# Patient Record
Sex: FEMALE | Race: WHITE
[De-identification: names, ages, dates, MRNs, and addresses within clinical notes are randomized per-mention and may not be internally consistent; named-entity substitution may affect disease eponyms.]

---

## 2017-04-11 ENCOUNTER — HOSPITAL ENCOUNTER (OUTPATIENT)
Dept: HOSPITAL 47 - RADMAMWWP | Age: 62
Discharge: HOME | End: 2017-04-11
Payer: COMMERCIAL

## 2017-04-11 DIAGNOSIS — M12.872: ICD-10-CM

## 2017-04-11 DIAGNOSIS — Z78.0: ICD-10-CM

## 2017-04-11 DIAGNOSIS — M12.871: ICD-10-CM

## 2017-04-11 DIAGNOSIS — Z12.31: Primary | ICD-10-CM

## 2017-04-11 PROCEDURE — 73630 X-RAY EXAM OF FOOT: CPT

## 2017-04-11 PROCEDURE — 77080 DXA BONE DENSITY AXIAL: CPT

## 2017-04-11 NOTE — BD
EXAMINATION TYPE: MG DEXA axial skeleton.  

 

DATE OF EXAM: 4/11/2017 3:09 PM

 

COMPARISON: DEXA bone scan November 9, 2014

 

CLINICAL HISTORY: Postmenopausal female.

 

Height:  62 IN

Weight:  262 LBS

 

FRAX RISK QUESTIONS:

Alcohol (3 or more units per day):  NO

Family History (Parent hip fracture):  NO

Glucocorticoids (More than 3mos):  NO

           (Ex: prednisone, prednisolone, methylprednisolone, dexamethasone, and hydrocortisone).    
     

History of Fracture in Adulthood: NO

Secondary Osteoporosis:

  1.  Type 1 Diabetes: NO

  2.  Hyperthyroidism: NO

  3.  Menopause before 45: AGE 59

  4.  Malnutrition: NO

  5.  Chronic liver disease: NO

Rheumatoid Arthritis: NO

Current Tobacco Use: NO

 

RISK FACTORS 

HISTORY OF: 

Active: YES

Postmenopausal woman: AGE 59

 

MEDICATIONS: 

Additional Medications: ARTHRITIS PAIN PILL, CHOLESTEROL 

 

 

 

 

EXAM MEASUREMENTS: 

Bone mineral densitometry was performed using the [employer] System.

Bone mineral density as measured about the Lumbar spine is:  

----- L1-L4(G/cm2): 1.091

T Score Values are as follows:

----- L2: -0.8

----- L3: -1.3

----- L4: -0.6

----- L1-L4: -0.7

Bone mineral density has: Decreased 5.3% since study of: 11/7/2014

 

Bone mineral density about the R hip (g/cm2): 1.123

Bone mineral density about the L hip (g/cm2): 0.983

T Score values are as follows:

-----R Neck: 0.6

-----L Neck: -0.4

-----R Intertrochanter: 0.6

-----L Intertrochanter: 0.9

Bone mineral density has: Decreased 3.6% since study of: 11/7/2014

 

 

 

IMPRESSION:

 

Normal (Values between +1 and -1 indicate normal bone mass) overall on current study.

 

 

 

NOTE:  T-SCORE=SD OF THE YOUNG ADULT MEAN.

## 2017-04-13 NOTE — MM
Reason for exam: screening  (asymptomatic).

Last mammogram was performed 1 year and 2 months ago.



History:

Patient is postmenopausal and is nulliparous.

Family history of breast cancer in maternal cousin at age 40.



Physical Findings:

A clinical breast exam by your physician is recommended on an annual basis and 

results should be correlated with mammographic findings.



MG Screening Mammo w CAD

Bilateral CC and MLO view(s) were taken.

Prior study comparison: February 19, 2016, left breast MG 3d work up w/cad LT.  

January 18, 2016, bilateral MG screening mammo w CAD.  November 7, 2014, bilateral

MG screening mammo w CAD.

There are scattered fibroglandular densities.  There is chronic nodularity in the 

right breast.  No significant changes when compared with prior studies.





ASSESSMENT: Negative, BI-RAD 1



RECOMMENDATION:

Routine screening mammogram of both breasts in 1 year.

## 2017-05-26 NOTE — XR
EXAMINATION TYPE: XR foot complete bilateral

 

DATE OF EXAM: 4/11/2017 3:57 PM

 

COMPARISON: NONE

 

HISTORY: Pain and swelling

 

TECHNIQUE: Three views are submitted from each foot.

 

FINDINGS:

Right foot: There is resorption the top of the distal phalanx first digit. Arthropathy of the MTP amie
nts noted. No erosive changes. Arthropathy of the navicular cuneiform joint. Large plantar calcaneal 
spur noted.

 

Left foot: Arthropathy of the MTP joints and DIP joints. Resorption tuft distal phalanx first digit s
ymmetric. Hypertrophic change involving the navicular cuneiform bone. Calcaneal spurs seen.

 

IMPRESSION:

1. No acute fracture or dislocation.  If symptoms persist, follow-up exam in 7 to 10 days could be ob
tained.  

2. Arthropathy and hypertrophic changes. Changes involving the navicular cuneiform junction likely ar
e hypertrophic and arthritic rather than related to tarsal coalition but could be correlated clinical
ly.
Partially impaired: cannot see medication labels or newsprint, but can see obstacles in path, and the surrounding layout; can count fingers at arm's length

## 2021-11-01 ENCOUNTER — HOSPITAL ENCOUNTER (INPATIENT)
Dept: HOSPITAL 47 - EC | Age: 66
LOS: 4 days | Discharge: HOME | DRG: 603 | End: 2021-11-05
Attending: FAMILY MEDICINE | Admitting: FAMILY MEDICINE
Payer: MEDICARE

## 2021-11-01 DIAGNOSIS — Z20.822: ICD-10-CM

## 2021-11-01 DIAGNOSIS — Z91.048: ICD-10-CM

## 2021-11-01 DIAGNOSIS — L40.9: ICD-10-CM

## 2021-11-01 DIAGNOSIS — E66.01: ICD-10-CM

## 2021-11-01 DIAGNOSIS — B95.62: ICD-10-CM

## 2021-11-01 DIAGNOSIS — E78.5: ICD-10-CM

## 2021-11-01 DIAGNOSIS — L03.116: Primary | ICD-10-CM

## 2021-11-01 DIAGNOSIS — Z90.710: ICD-10-CM

## 2021-11-01 DIAGNOSIS — M06.9: ICD-10-CM

## 2021-11-01 LAB
ALBUMIN SERPL-MCNC: 3.5 G/DL (ref 3.5–5)
ALP SERPL-CCNC: 101 U/L (ref 38–126)
ALT SERPL-CCNC: 45 U/L (ref 4–34)
ANION GAP SERPL CALC-SCNC: 10 MMOL/L
AST SERPL-CCNC: 40 U/L (ref 14–36)
BASOPHILS # BLD AUTO: 0.1 K/UL (ref 0–0.2)
BASOPHILS NFR BLD AUTO: 1 %
BUN SERPL-SCNC: 22 MG/DL (ref 7–17)
CALCIUM SPEC-MCNC: 8.7 MG/DL (ref 8.4–10.2)
CHLORIDE SERPL-SCNC: 104 MMOL/L (ref 98–107)
CO2 SERPL-SCNC: 23 MMOL/L (ref 22–30)
EOSINOPHIL # BLD AUTO: 0.1 K/UL (ref 0–0.7)
EOSINOPHIL NFR BLD AUTO: 1 %
ERYTHROCYTE [DISTWIDTH] IN BLOOD BY AUTOMATED COUNT: 4.62 M/UL (ref 3.8–5.4)
ERYTHROCYTE [DISTWIDTH] IN BLOOD: 13.9 % (ref 11.5–15.5)
GLUCOSE SERPL-MCNC: 136 MG/DL (ref 74–99)
HCT VFR BLD AUTO: 44.6 % (ref 34–46)
HGB BLD-MCNC: 14.4 GM/DL (ref 11.4–16)
LYMPHOCYTES # SPEC AUTO: 0.9 K/UL (ref 1–4.8)
LYMPHOCYTES NFR SPEC AUTO: 12 %
MCH RBC QN AUTO: 31.2 PG (ref 25–35)
MCHC RBC AUTO-ENTMCNC: 32.3 G/DL (ref 31–37)
MCV RBC AUTO: 96.6 FL (ref 80–100)
MONOCYTES # BLD AUTO: 0.4 K/UL (ref 0–1)
MONOCYTES NFR BLD AUTO: 5 %
NEUTROPHILS # BLD AUTO: 6.1 K/UL (ref 1.3–7.7)
NEUTROPHILS NFR BLD AUTO: 80 %
PLATELET # BLD AUTO: 188 K/UL (ref 150–450)
POTASSIUM SERPL-SCNC: 4.6 MMOL/L (ref 3.5–5.1)
PROT SERPL-MCNC: 7.3 G/DL (ref 6.3–8.2)
SODIUM SERPL-SCNC: 137 MMOL/L (ref 137–145)
WBC # BLD AUTO: 7.7 K/UL (ref 3.8–10.6)

## 2021-11-01 PROCEDURE — 81001 URINALYSIS AUTO W/SCOPE: CPT

## 2021-11-01 PROCEDURE — 87635 SARS-COV-2 COVID-19 AMP PRB: CPT

## 2021-11-01 PROCEDURE — 96365 THER/PROPH/DIAG IV INF INIT: CPT

## 2021-11-01 PROCEDURE — 83605 ASSAY OF LACTIC ACID: CPT

## 2021-11-01 PROCEDURE — 80053 COMPREHEN METABOLIC PANEL: CPT

## 2021-11-01 PROCEDURE — 99284 EMERGENCY DEPT VISIT MOD MDM: CPT

## 2021-11-01 PROCEDURE — 86140 C-REACTIVE PROTEIN: CPT

## 2021-11-01 PROCEDURE — 80048 BASIC METABOLIC PNL TOTAL CA: CPT

## 2021-11-01 PROCEDURE — 36415 COLL VENOUS BLD VENIPUNCTURE: CPT

## 2021-11-01 PROCEDURE — 85027 COMPLETE CBC AUTOMATED: CPT

## 2021-11-01 PROCEDURE — 87040 BLOOD CULTURE FOR BACTERIA: CPT

## 2021-11-01 PROCEDURE — 96367 TX/PROPH/DG ADDL SEQ IV INF: CPT

## 2021-11-01 PROCEDURE — 85025 COMPLETE CBC W/AUTO DIFF WBC: CPT

## 2021-11-01 RX ADMIN — HYDROCODONE BITARTRATE AND ACETAMINOPHEN PRN EACH: 5; 325 TABLET ORAL at 19:06

## 2021-11-01 NOTE — ED
Extremity Problem HPI





- General


Chief complaint: Extremity Problem,Nontraumatic


Stated complaint: Leg swollen


Source: patient


Mode of arrival: ambulatory





- History of Present Illness


Initial comments: 


66-year-old female past medical history of rheumatoid arthritis, hyperlipidemia 

presents to the emergency department with swollen left lower leg.  States that 

her symptoms started a few days ago and have been progressively getting worse.  

Her left leg is now twice as large as her right and is warm to the touch.  She 

also has some blistering due to the increased swelling.  Denies history of DVT 

or PE.  No chest pain or shortness of breath.  Does admit to chills.  No 

recorded fevers.  No history of similar in the past.  Does have a history of 

psoriasis.  She has had difficulty regulating on it due to the pain.  Denies any

trauma.  No other alleviating, precipitating or modifying factors. 





- Related Data


                                Home Medications











 Medication  Instructions  Recorded  Confirmed


 


Ibuprofen [Advil] 200 mg PO Q6HR PRN 10/08/14 10/09/14











                                    Allergies











Allergy/AdvReac Type Severity Reaction Status Date / Time


 


adhesive Allergy  Rash/Hives. Verified 11/01/21 15:26





   IRIS  














Review of Systems


ROS Statement: 


Those systems with pertinent positive or pertinent negative responses have been 

documented in the HPI.





ROS Other: All systems not noted in ROS Statement are negative.





Past Medical History


Past Medical History: No Reported History, Hyperlipidemia, Rheumatoid Arthritis 

(RA)


History of Any Multi-Drug Resistant Organisms: None Reported


Past Surgical History: Appendectomy, Hysterectomy, Tonsillectomy


Past Anesthesia/Blood Transfusion Reactions: No Reported Reaction


Past Psychological History: No Psychological Hx Reported


Smoking Status: Never smoker


Past Alcohol Use History: None Reported


Past Drug Use History: None Reported





Course


                                   Vital Signs











  11/01/21 11/01/21





  15:21 18:02


 


Temperature 98.8 F 


 


Pulse Rate 97 85


 


Respiratory 18 20





Rate  


 


Blood Pressure 145/75 129/69


 


O2 Sat by Pulse 97 98





Oximetry  














Medical Decision Making





- Medical Decision Making





On arrival the patient is placed in the hallway 10.  Thorough history and 

physical exam is performed.  IV is established and laboratory studies were 

conducted.  Patient was given a dose of Unasyn and Vanco.  Ultrasound was 

attempted however unable to visualize most of the lower extremity.  Popliteal 

vein is negative.  Laboratory studies are reviewed and demonstrate a C-reactive 

protein of 50.9.  Did recommend admitting the patient due to the extensive 

nature of the cellulitis for which the patient did agree to.  Spoke with Dr. Mejía who agreed to admit the patient.  Dr. Martin will be placed on consult.  

Patient remained in stable condition awaiting a bed





- Lab Data


Result diagrams: 


                                 11/01/21 16:16





                                 11/01/21 16:16


                                   Lab Results











  11/01/21 11/01/21 11/01/21 Range/Units





  16:16 16:16 16:16 


 


WBC  7.7    (3.8-10.6)  k/uL


 


RBC  4.62    (3.80-5.40)  m/uL


 


Hgb  14.4    (11.4-16.0)  gm/dL


 


Hct  44.6    (34.0-46.0)  %


 


MCV  96.6    (80.0-100.0)  fL


 


MCH  31.2    (25.0-35.0)  pg


 


MCHC  32.3    (31.0-37.0)  g/dL


 


RDW  13.9    (11.5-15.5)  %


 


Plt Count  188    (150-450)  k/uL


 


MPV  8.1    


 


Neutrophils %  80    %


 


Lymphocytes %  12    %


 


Monocytes %  5    %


 


Eosinophils %  1    %


 


Basophils %  1    %


 


Neutrophils #  6.1    (1.3-7.7)  k/uL


 


Lymphocytes #  0.9 L    (1.0-4.8)  k/uL


 


Monocytes #  0.4    (0-1.0)  k/uL


 


Eosinophils #  0.1    (0-0.7)  k/uL


 


Basophils #  0.1    (0-0.2)  k/uL


 


Sodium   137   (137-145)  mmol/L


 


Potassium   4.6   (3.5-5.1)  mmol/L


 


Chloride   104   ()  mmol/L


 


Carbon Dioxide   23   (22-30)  mmol/L


 


Anion Gap   10   mmol/L


 


BUN   22 H   (7-17)  mg/dL


 


Creatinine   0.74   (0.52-1.04)  mg/dL


 


Est GFR (CKD-EPI)AfAm   >90   (>60 ml/min/1.73 sqM)  


 


Est GFR (CKD-EPI)NonAf   87   (>60 ml/min/1.73 sqM)  


 


Glucose   136 H   (74-99)  mg/dL


 


Plasma Lactic Acid Vinay    1.6  (0.7-2.0)  mmol/L


 


Calcium   8.7   (8.4-10.2)  mg/dL


 


Total Bilirubin   0.5   (0.2-1.3)  mg/dL


 


AST   40 H   (14-36)  U/L


 


ALT   45 H   (4-34)  U/L


 


Alkaline Phosphatase   101   ()  U/L


 


C-Reactive Protein   15.9 H   (<1.0)  mg/dL


 


Total Protein   7.3   (6.3-8.2)  g/dL


 


Albumin   3.5   (3.5-5.0)  g/dL














Disposition


Clinical Impression: 


 Left leg cellulitis





Disposition: ADMITTED AS IP TO THIS HOSP


Condition: Stable


Is patient prescribed a controlled substance at d/c from ED?: No


Referrals: 


Jimenez Mejía MD [Primary Care Provider] - 1-2 days


Decision to Admit Reason: Admit from EC


Decision Date: 11/01/21


Decision Time: 18:27

## 2021-11-01 NOTE — US
EXAMINATION TYPE: US venous doppler duplex LE LT

 

DATE OF EXAM: 11/1/2021 5:17 PM

 

COMPARISON: NONE

 

CLINICAL HISTORY: dvt. 

 

SIDE PERFORMED: Left  

 

TECHNIQUE:  The lower extremity deep venous system is examined utilizing real time linear array sonog
sadie with graded compression, doppler sonography and color-flow sonography.

 

VESSELS IMAGED:

 

 

Morbidly obese patient with extensive intrastitial edema, unable to get on ultrasound stretcher. Migdalia
ent scanned in wheelchair.

 

Left Leg:  The only vessel identified during study was popliteal vein seen only with doppler, patent 
popliteal vein. 

 

 

IMPRESSION: Suboptimal evaluation due to patient body habitus and edema. Only popliteal vein visualiz
ed and patent.

## 2021-11-02 LAB
ANION GAP SERPL CALC-SCNC: 17 MMOL/L (ref 4–12)
BASOPHILS # BLD AUTO: 0 K/UL (ref 0–0.2)
BASOPHILS NFR BLD AUTO: 0 %
BUN SERPL-SCNC: 14.8 MG/DL (ref 9–27)
BUN/CREAT SERPL: 21.14 RATIO (ref 12–20)
CALCIUM SPEC-MCNC: 8.1 MG/DL (ref 8.7–10.3)
CHLORIDE SERPL-SCNC: 104 MMOL/L (ref 96–109)
CO2 SERPL-SCNC: 15 MMOL/L (ref 21.6–31.8)
EOSINOPHIL # BLD AUTO: 0.1 K/UL (ref 0–0.7)
EOSINOPHIL NFR BLD AUTO: 1 %
ERYTHROCYTE [DISTWIDTH] IN BLOOD BY AUTOMATED COUNT: 4.27 M/UL (ref 3.8–5.4)
ERYTHROCYTE [DISTWIDTH] IN BLOOD: 14.3 % (ref 11.5–15.5)
GLUCOSE SERPL-MCNC: 124 MG/DL (ref 70–110)
HCT VFR BLD AUTO: 41 % (ref 34–46)
HGB BLD-MCNC: 13.5 GM/DL (ref 11.4–16)
LYMPHOCYTES # SPEC AUTO: 1.3 K/UL (ref 1–4.8)
LYMPHOCYTES NFR SPEC AUTO: 17 %
MCH RBC QN AUTO: 31.6 PG (ref 25–35)
MCHC RBC AUTO-ENTMCNC: 32.9 G/DL (ref 31–37)
MCV RBC AUTO: 96.1 FL (ref 80–100)
MONOCYTES # BLD AUTO: 0.5 K/UL (ref 0–1)
MONOCYTES NFR BLD AUTO: 6 %
NEUTROPHILS # BLD AUTO: 5.3 K/UL (ref 1.3–7.7)
NEUTROPHILS NFR BLD AUTO: 71 %
PH UR: 5.5 [PH] (ref 5–8)
PLATELET # BLD AUTO: 199 K/UL (ref 150–450)
POTASSIUM SERPL-SCNC: 4.3 MMOL/L (ref 3.5–5.5)
PROT UR QL: (no result)
RBC UR QL: 1 /HPF (ref 0–5)
SODIUM SERPL-SCNC: 136 MMOL/L (ref 135–145)
SP GR UR: 1.03 (ref 1–1.03)
SQUAMOUS UR QL AUTO: 4 /HPF (ref 0–4)
UROBILINOGEN UR QL STRIP: 2 MG/DL (ref ?–2)
WBC # BLD AUTO: 7.4 K/UL (ref 3.8–10.6)
WBC # UR AUTO: 9 /HPF (ref 0–5)

## 2021-11-02 RX ADMIN — AMPICILLIN SODIUM AND SULBACTAM SODIUM SCH MLS/HR: 2; 1 INJECTION, POWDER, FOR SOLUTION INTRAMUSCULAR; INTRAVENOUS at 00:05

## 2021-11-02 RX ADMIN — CEFAZOLIN SCH MLS/HR: 330 INJECTION, POWDER, FOR SOLUTION INTRAMUSCULAR; INTRAVENOUS at 11:53

## 2021-11-02 RX ADMIN — HYDROCODONE BITARTRATE AND ACETAMINOPHEN PRN EACH: 5; 325 TABLET ORAL at 00:07

## 2021-11-02 RX ADMIN — ATORVASTATIN CALCIUM SCH MG: 10 TABLET, FILM COATED ORAL at 21:23

## 2021-11-02 RX ADMIN — HEPARIN SODIUM SCH UNIT: 5000 INJECTION INTRAVENOUS; SUBCUTANEOUS at 08:47

## 2021-11-02 RX ADMIN — HYDROCODONE BITARTRATE AND ACETAMINOPHEN PRN EACH: 5; 325 TABLET ORAL at 19:45

## 2021-11-02 RX ADMIN — HYDROCODONE BITARTRATE AND ACETAMINOPHEN PRN EACH: 5; 325 TABLET ORAL at 16:05

## 2021-11-02 RX ADMIN — PANTOPRAZOLE SODIUM SCH MG: 40 INJECTION, POWDER, FOR SOLUTION INTRAVENOUS at 08:47

## 2021-11-02 RX ADMIN — AMPICILLIN SODIUM AND SULBACTAM SODIUM SCH MLS/HR: 2; 1 INJECTION, POWDER, FOR SOLUTION INTRAMUSCULAR; INTRAVENOUS at 08:42

## 2021-11-02 RX ADMIN — HEPARIN SODIUM SCH UNIT: 5000 INJECTION INTRAVENOUS; SUBCUTANEOUS at 21:00

## 2021-11-02 RX ADMIN — HYDROCODONE BITARTRATE AND ACETAMINOPHEN PRN EACH: 5; 325 TABLET ORAL at 08:57

## 2021-11-02 NOTE — P.CONS
History of Present Illness





- Reason for Consult


Consult date: 11/02/21


left leg cellulitis


Requesting physician: Jimenez Mejía





- Chief Complaint


left leg swelling and redness x few days





- History of Present Illness


History of present illness : Patient is 66-year-old  female presenting 

to the hospital last evening for evaluation of increasing pain swelling redness 

of the left lower extremity that apparently has been going on for the last few 

days patient did have progressive worsening swelling to the left leg and 

subsequently resulted to becoming more redder and painful patient described the 

pain to be more of a dull aching to throbbing 5-6 out of 10 and no radiation 

patient has been complaining of fever with chills with the symptom the patient 

was evaluated by the physician on arrival to the ER the patient was afebrile 

subsequently have low-grade fever of 99.4 degree form of endometrial normal 

white count creatinine was normal her lactate is mildly elevated urine was 

negative blood cultures were obtained patient did have a lower extremity Doppler

that has been negative for DVT though it was a suboptimal study patient was 

started on Unasyn and vancomycin she was admitted to hospital infectious disease

was consulted for further management of antibiotic therapy














Review of system:


 CONSTITUTIONAL:  Positive for weakness along with the fever.


EYES:  No complaint.


ENT:  No complaint.


RESPIRATORY:  No complaint.


CARDIOVASCULAR:  No complaint.


GENITOURINARY:  No complaint.


GASTROINTESTINAL:  No complaint.


MUSCULOSKELETAL: As per history of present illness INTEGUMENTARY: As per history

of present illness.


PSYCHOLOGIC: No complaint.


ENDOCRINE: No complaint.


NEUROLOGIC:  No complaint.








Past medical history : Reviewed, documented below


Past surgical history : Reviewed, documented below


Social history: Reviewed, documented below


Medications: Reviewed, as documented below








EXAMINATION:


Vital sigans=  Reviewed and documented below


GENERAL DESCRIPTION: Middle-aged female lying in bed, no distress. No tachypnea 

or accessory muscle of respiration use.


HEENT: Shows Pallor , no scleral icterus. Oral mucous membrane is dry.


NECK: Trachea central, no thyromegaly.


LUNGS: Unlabored breathing. Clear to auscultation anteriorly. No wheeze or 

crackle.


HEART: S1, S2, regular rate and rhythm.


ABDOMEN: Soft, no tenderness , guarding or rigidity


EXTREMITIES: Diffuse swelling redness of the left lower extremity which is warm 

to touch some blister no drainage.


SKIN: No rash, no masses palpable.


NEUROLOGICAL: The patient is awake, alert, oriented x3, mood and affect normal.





LABS AND RADIOLOGY: Reviewed results see below





Assessment : Patient presented to hospital with acute left lower extremity 

cellulitis in this patient who did have refused swelling redness likely 

streptococcal disease clinic note MRSA gram-negative infection











Plan: 1-discontinue vancomycin and Unasyn


2-cefazolin 3 g every 8 hours


3-Marked area of the redness and Ace wrap from just above the toe to below the 

knee


We will follow on clinical condition and cultures to further adjust medication 

if needed


Thank you for this consultation we will follow the patient along with you








Past Medical History


Past Medical History: Hyperlipidemia, Rheumatoid Arthritis (RA)


History of Any Multi-Drug Resistant Organisms: None Reported


Past Surgical History: Appendectomy, Hysterectomy, Tonsillectomy


Past Anesthesia/Blood Transfusion Reactions: No Reported Reaction


Past Psychological History: No Psychological Hx Reported


Smoking Status: Never smoker


Past Alcohol Use History: None Reported


Past Drug Use History: None Reported





Medications and Allergies


                                Home Medications











 Medication  Instructions  Recorded  Confirmed  Type


 


Ibuprofen [Advil] 200 mg PO Q6HR PRN 10/08/14 11/01/21 History


 


Adalimumab [Humira(Cf) Pen] 40 mg SQ Q14D 11/01/21 11/01/21 History


 


Simvastatin [Zocor] 20 mg PO HS 11/01/21 11/01/21 History


 


traMADol HCL 50 mg PO Q6H PRN 11/01/21 11/01/21 History








                                    Allergies











Allergy/AdvReac Type Severity Reaction Status Date / Time


 


adhesive Allergy  Rash/Hives. Verified 11/01/21 18:52





   WELTS  














Physical Exam


Vitals: 


                                   Vital Signs











  Temp Pulse Pulse Resp BP BP Pulse Ox


 


 11/02/21 08:00    100  16   


 


 11/02/21 05:40  99.4 F   100  16   134/77  94 L


 


 11/01/21 20:46  97.8 F  96   18  122/74   98


 


 11/01/21 20:00  98.2 F   103 H  16   146/85  98


 


 11/01/21 18:02   85   20  129/69   98


 


 11/01/21 15:21  98.8 F  97   18  145/75   97








                                Intake and Output











 11/01/21 11/02/21 11/02/21





 22:59 06:59 14:59


 


Intake Total 360 600 


 


Balance 360 600 


 


Intake:   


 


  Intake, IV Titration  600 





  Amount   


 


    Ampicillin-Sulbactam 3 gm  100 





    In Sodium Chloride 0.9%   





    100 ml @ 200 mls/hr IVPB   





    Q8HR [facility] Rx#:[id number]   


 


    Vancomycin 1,750 mg In  500 





    Sodium Chloride 0.9% 500   





    ml 500 ml @ 167 mls/hr   





    IVPB Q16H [facility] Rx#:   





    [id number]   


 


  Oral 360  


 


Other:   


 


  Voiding Method   Toilet


 


  # Voids  1 


 


  Weight 117.934 kg  














Results


CBC & Chem 7: 


                                 11/02/21 04:11





                                 11/02/21 04:11


Labs: 


                  Abnormal Lab Results - Last 24 Hours (Table)











  11/01/21 11/01/21 11/02/21 Range/Units





  16:16 16:16 09:38 


 


Lymphocytes #  0.9 L    (1.0-4.8)  k/uL


 


BUN   22 H   (7-17)  mg/dL


 


Glucose   136 H   (74-99)  mg/dL


 


Plasma Lactic Acid Vinay    2.4 H*  (0.7-2.0)  mmol/L


 


AST   40 H   (14-36)  U/L


 


ALT   45 H   (4-34)  U/L


 


C-Reactive Protein   15.9 H   (<1.0)  mg/dL

## 2021-11-02 NOTE — P.HPIM
History of Present Illness


H&P Date: 11/02/21


Chief Complaint: Left lower extremity erythema





This is a history of physical 66-year-old white female with known history of 

psoriasis who states about 3-4 days prior to admission, the patient had 

significant redness.  She states that yesterday, her leg continue to swell and 

became more red.  She has not been treated for lower extremity cellulitis.  The 

patient states low-grade fever.  No chills no significant nausea, vomiting or 

diarrhea.  No previous history of leg infection.  Trauma.  She was not doing any

yard work no history of insect bites or any type of animal bites stated.





Review of Systems


Constitutional: Reports chronic pain, Reports fever, Denies chills


Eyes: denies blurred vision, denies pain


Ears, nose, mouth and throat: Denies headache, Denies sore throat


Respiratory: Denies cough


Gastrointestinal: Reports as per HPI


Integumentary: Reports rash


Neurological: Denies numbness, Denies weakness


Psychiatric: Denies anxiety, Denies depression





Past Medical History


Past Medical History: Hyperlipidemia, Rheumatoid Arthritis (RA)


History of Any Multi-Drug Resistant Organisms: None Reported


Past Surgical History: Appendectomy, Hysterectomy, Tonsillectomy


Past Anesthesia/Blood Transfusion Reactions: No Reported Reaction


Past Psychological History: No Psychological Hx Reported


Smoking Status: Never smoker


Past Alcohol Use History: None Reported


Past Drug Use History: None Reported





Medications and Allergies


                                Home Medications











 Medication  Instructions  Recorded  Confirmed  Type


 


Ibuprofen [Advil] 200 mg PO Q6HR PRN 10/08/14 11/01/21 History


 


Adalimumab [Humira(Cf) Pen] 40 mg SQ Q14D 11/01/21 11/01/21 History


 


Simvastatin [Zocor] 20 mg PO HS 11/01/21 11/01/21 History


 


traMADol HCL 50 mg PO Q6H PRN 11/01/21 11/01/21 History








                                    Allergies











Allergy/AdvReac Type Severity Reaction Status Date / Time


 


adhesive Allergy  Rash/Hives. Verified 11/01/21 18:52





   IRIS  














Physical Exam


Vitals: 


                                   Vital Signs











  Temp Pulse Pulse Resp BP BP Pulse Ox


 


 11/02/21 05:40  99.4 F   100  16   134/77  94 L


 


 11/01/21 20:46  97.8 F  96   18  122/74   98


 


 11/01/21 20:00  98.2 F   103 H  16   146/85  98


 


 11/01/21 18:02   85   20  129/69   98


 


 11/01/21 15:21  98.8 F  97   18  145/75   97








                                Intake and Output











 11/01/21 11/02/21 11/02/21





 22:59 06:59 14:59


 


Intake Total 360 600 


 


Balance 360 600 


 


Intake:   


 


  Intake, IV Titration  600 





  Amount   


 


    Ampicillin-Sulbactam 3 gm  100 





    In Sodium Chloride 0.9%   





    100 ml @ 200 mls/hr IVPB   





    Q8HR [facility] Rx#:[id number]   


 


    Vancomycin 1,750 mg In  500 





    Sodium Chloride 0.9% 500   





    ml 500 ml @ 167 mls/hr   





    IVPB Q16H [facility] Rx#:   





    [id number]   


 


  Oral 360  


 


Other:   


 


  # Voids  1 


 


  Weight 117.934 kg  














- Constitutional


General appearance: morbidly obese





- EENT


Eyes: no abnormal pupil





- Neck


Neck: no lymphadenopathy





- Respiratory


Respiratory: bilateral: CTA





- Cardiovascular


Rhythm: regular


Heart sounds: normal: S1, S2


Abnormal Heart Sounds: no S3 Gallop





- Gastrointestinal


General gastrointestinal: soft, no umbilical hernia





- Integumentary


Integumentary: cellulitis





- Psychiatric


Psychiatric: A&O x's 3





Results


CBC & Chem 7: 


                                 11/02/21 04:11





                                 11/01/21 16:16


Labs: 


                  Abnormal Lab Results - Last 24 Hours (Table)











  11/01/21 11/01/21 Range/Units





  16:16 16:16 


 


Lymphocytes #  0.9 L   (1.0-4.8)  k/uL


 


BUN   22 H  (7-17)  mg/dL


 


Glucose   136 H  (74-99)  mg/dL


 


AST   40 H  (14-36)  U/L


 


ALT   45 H  (4-34)  U/L


 


C-Reactive Protein   15.9 H  (<1.0)  mg/dL














Thrombosis Risk Factor Assmnt





- Choose All That Apply


Any of the Below Risk Factors Present?: Yes


Each Factor Represents 1 point: Obesity (BMI >25), Swollen legs (current)


Other Risk Factors: Yes


Each Risk Factor Represents 2 Points: Age 61-74 years


Other congenital or acquired thrombophilia - If yes, enter type in comment: No


Thrombosis Risk Factor Assessment Total Risk Factor Score: 4


Thrombosis Risk Factor Assessment Level: Moderate Risk





Assessment and Plan


(1) Rheumatoid arthritis


Current Visit: Yes   Status: Acute   Code(s): M06.9 - RHEUMATOID ARTHRITIS, 

UNSPECIFIED   SNOMED Code(s): 95870203


   





(2) Morbid obesity


Current Visit: Yes   Status: Acute   Code(s): E66.01 - MORBID (SEVERE) OBESITY 

DUE TO EXCESS CALORIES   SNOMED Code(s): 221432513


   





(3) Psoriasis


Current Visit: Yes   Status: Acute   Code(s): L40.9 - PSORIASIS, UNSPECIFIED   

SNOMED Code(s): 9055814


   





(4) Left leg cellulitis


Current Visit: Yes   Status: Acute   Code(s): L03.116 - CELLULITIS OF LEFT LOWER

 LIMB   SNOMED Code(s): 035810619


   


Plan: 





Continue Unasyn.





Consult infectious disease.





Check CBC and CMP in a.m.





She is a full code.





DVT prophylaxis/GI prophylaxis.

## 2021-11-03 LAB
ALBUMIN SERPL-MCNC: 2.8 G/DL (ref 3.5–5)
ALP SERPL-CCNC: 76 U/L (ref 38–126)
ALT SERPL-CCNC: 38 U/L (ref 4–34)
ANION GAP SERPL CALC-SCNC: 6 MMOL/L
AST SERPL-CCNC: 39 U/L (ref 14–36)
BUN SERPL-SCNC: 11 MG/DL (ref 7–17)
CALCIUM SPEC-MCNC: 8.2 MG/DL (ref 8.4–10.2)
CHLORIDE SERPL-SCNC: 109 MMOL/L (ref 98–107)
CO2 SERPL-SCNC: 23 MMOL/L (ref 22–30)
ERYTHROCYTE [DISTWIDTH] IN BLOOD BY AUTOMATED COUNT: 4.24 M/UL (ref 3.8–5.4)
ERYTHROCYTE [DISTWIDTH] IN BLOOD: 14.2 % (ref 11.5–15.5)
GLUCOSE SERPL-MCNC: 132 MG/DL (ref 74–99)
HCT VFR BLD AUTO: 40.1 % (ref 34–46)
HGB BLD-MCNC: 13.3 GM/DL (ref 11.4–16)
MCH RBC QN AUTO: 31.4 PG (ref 25–35)
MCHC RBC AUTO-ENTMCNC: 33.2 G/DL (ref 31–37)
MCV RBC AUTO: 94.5 FL (ref 80–100)
PLATELET # BLD AUTO: 220 K/UL (ref 150–450)
POTASSIUM SERPL-SCNC: 4.3 MMOL/L (ref 3.5–5.1)
PROT SERPL-MCNC: 6.3 G/DL (ref 6.3–8.2)
SODIUM SERPL-SCNC: 138 MMOL/L (ref 137–145)
WBC # BLD AUTO: 7.7 K/UL (ref 3.8–10.6)

## 2021-11-03 RX ADMIN — HYDROCODONE BITARTRATE AND ACETAMINOPHEN PRN EACH: 5; 325 TABLET ORAL at 14:50

## 2021-11-03 RX ADMIN — HEPARIN SODIUM SCH UNIT: 5000 INJECTION INTRAVENOUS; SUBCUTANEOUS at 07:56

## 2021-11-03 RX ADMIN — ATORVASTATIN CALCIUM SCH MG: 10 TABLET, FILM COATED ORAL at 20:26

## 2021-11-03 RX ADMIN — HYDROCODONE BITARTRATE AND ACETAMINOPHEN PRN EACH: 5; 325 TABLET ORAL at 07:54

## 2021-11-03 RX ADMIN — CEFAZOLIN SCH MLS/HR: 330 INJECTION, POWDER, FOR SOLUTION INTRAMUSCULAR; INTRAVENOUS at 14:51

## 2021-11-03 RX ADMIN — CEFAZOLIN SCH: 330 INJECTION, POWDER, FOR SOLUTION INTRAMUSCULAR; INTRAVENOUS at 06:34

## 2021-11-03 RX ADMIN — PANTOPRAZOLE SODIUM SCH MG: 40 INJECTION, POWDER, FOR SOLUTION INTRAVENOUS at 07:55

## 2021-11-03 RX ADMIN — HEPARIN SODIUM SCH UNIT: 5000 INJECTION INTRAVENOUS; SUBCUTANEOUS at 20:26

## 2021-11-03 NOTE — P.PN
Subjective


Principal diagnosis: 





Left lower extremity cellulitis





The patient is seen after being given appropriate antibiotic treatment for left 

lower 72 cellulitis.  She feels much better today.  Pain is relatively well 

controlled but still somewhat tough to deal with.  Minimal ambulation is noted. 

No voiding difficulties.  No significant nausea, vomiting or diarrhea is noted. 

No overt fever this morning.





Objective





- Vital Signs


Vital signs: 


                                   Vital Signs











Temp  98.5 F   11/03/21 07:43


 


Pulse  92   11/03/21 07:43


 


Resp  16   11/03/21 07:43


 


BP  121/67   11/03/21 07:43


 


Pulse Ox  95   11/03/21 07:43








                                 Intake & Output











 11/02/21 11/03/21 11/03/21





 18:59 06:59 18:59


 


Intake Total 360  


 


Balance 360  


 


Intake:   


 


  Oral 360  


 


Other:   


 


  Voiding Method Toilet  


 


  # Voids 1  














- Constitutional


General appearance: Present: morbidly obese





- EENT


Eyes: Absent: abnormal pupil





- Respiratory


Respiratory: bilateral: CTA





- Cardiovascular


Rhythm: regular


Heart sounds: normal: S1, S2


Abnormal Heart Sounds: Absent: S3 Gallop





- Gastrointestinal


General gastrointestinal: Present: soft.  Absent: tenderness





- Integumentary


Integumentary: Present: cellulitis





- Psychiatric


Psychiatric: Present: A&O x's 3





- Labs


CBC & Chem 7: 


                                 11/03/21 06:26





                                 11/03/21 06:26


Labs: 


                  Abnormal Lab Results - Last 24 Hours (Table)











  11/02/21 11/02/21 11/02/21 Range/Units





  04:11 09:38 12:20 


 


Chloride     ()  mmol/L


 


Carbon Dioxide  15.0 L    (21.6-31.8)  mmol/L


 


Anion Gap  17.00 H    (4.00-12.00)  mmol/L


 


BUN/Creatinine Ratio  21.14 H    (12.00-20.00)  Ratio


 


Glucose  124 H    ()  mg/dL


 


Plasma Lactic Acid Vinay   2.4 H*   (0.7-2.0)  mmol/L


 


Calcium  8.1 L    (8.7-10.3)  mg/dL


 


AST     (14-36)  U/L


 


ALT     (4-34)  U/L


 


Albumin     (3.5-5.0)  g/dL


 


Urine Protein    1+ H  (Negative)  


 


Ur Leukocyte Esterase    Small H  (Negative)  


 


Urine WBC    9 H  (0-5)  /hpf


 


Urine Mucus    Rare H  (None)  /hpf














  11/03/21 Range/Units





  06:26 


 


Chloride  109 H  ()  mmol/L


 


Carbon Dioxide   (21.6-31.8)  mmol/L


 


Anion Gap   (4.00-12.00)  mmol/L


 


BUN/Creatinine Ratio   (12.00-20.00)  Ratio


 


Glucose  132 H  ()  mg/dL


 


Plasma Lactic Acid Vinay   (0.7-2.0)  mmol/L


 


Calcium  8.2 L  (8.7-10.3)  mg/dL


 


AST  39 H  (14-36)  U/L


 


ALT  38 H  (4-34)  U/L


 


Albumin  2.8 L  (3.5-5.0)  g/dL


 


Urine Protein   (Negative)  


 


Ur Leukocyte Esterase   (Negative)  


 


Urine WBC   (0-5)  /hpf


 


Urine Mucus   (None)  /hpf








                      Microbiology - Last 24 Hours (Table)











 11/01/21 16:25 Blood Culture - Preliminary





 Blood    No Growth after 24 hours


 


 11/01/21 16:25 Blood Culture - Preliminary





 Blood    No Growth after 24 hours














Assessment and Plan


(1) Rheumatoid arthritis


Current Visit: Yes   Status: Acute   Code(s): M06.9 - RHEUMATOID ARTHRITIS, 

UNSPECIFIED   SNOMED Code(s): 75256283


   





(2) Morbid obesity


Current Visit: Yes   Status: Acute   Code(s): E66.01 - MORBID (SEVERE) OBESITY 

DUE TO EXCESS CALORIES   SNOMED Code(s): 257712897


   





(3) Psoriasis


Current Visit: Yes   Status: Acute   Code(s): L40.9 - PSORIASIS, UNSPECIFIED   

SNOMED Code(s): 4220025


   





(4) Left leg cellulitis


Current Visit: Yes   Status: Acute   Code(s): L03.116 - CELLULITIS OF LEFT LOWER

LIMB   SNOMED Code(s): 580577516


   


Plan: 





Continue Unasyn.





Consult infectious disease.





Check CBC and CMP in a.m.





She is a full code.





DVT prophylaxis/GI prophylaxis.





Anticipate discharge in next 24-48 hours if she continues this appropriate 

trajectory of recovery.

## 2021-11-03 NOTE — PN
PROGRESS NOTE



DATE OF SERVICE:

11/03/2021



REASON FOR FOLLOWUP:

Left lower extremity cellulitis.



INTERVAL HISTORY:

The patient is afebrile.  The patient is breathing comfortably.  Overall pain and

discomfort to the leg has slightly decreased.  No chest pain, shortness of breath or

cough.  No abdominal pain or diarrhea.



PHYSICAL EXAMINATION:

Blood pressure 150/85, pulse of 94, temperature 97.8. She is 96% on room air.

General description is a middle-aged female, in no distress.

The left leg swelling and redness has minimally improved, per the nursing staff who

changed the dressing, and no drainage was noticed.



LABS:

Hemoglobin is 13.3, white count 7.7, creatinine 0.63.



DIAGNOSTIC IMPRESSION AND PLAN:

Patient with left lower extremity cellulitis with diffuse swelling with likely

streptococcal disease.  Patient at this time to continue with cefazolin 2 grams q.8

hours along with Ace wrap to keep the swelling down. Continue supportive care.





MMODL / IJN: [id number] / Job#: [id number]

## 2021-11-04 VITALS — RESPIRATION RATE: 18 BRPM

## 2021-11-04 LAB
ALBUMIN SERPL-MCNC: 2.8 G/DL (ref 3.5–5)
ALP SERPL-CCNC: 78 U/L (ref 38–126)
ALT SERPL-CCNC: 25 U/L (ref 4–34)
ANION GAP SERPL CALC-SCNC: 6 MMOL/L
AST SERPL-CCNC: 32 U/L (ref 14–36)
BUN SERPL-SCNC: 11 MG/DL (ref 7–17)
CALCIUM SPEC-MCNC: 8 MG/DL (ref 8.4–10.2)
CHLORIDE SERPL-SCNC: 106 MMOL/L (ref 98–107)
CO2 SERPL-SCNC: 25 MMOL/L (ref 22–30)
ERYTHROCYTE [DISTWIDTH] IN BLOOD BY AUTOMATED COUNT: 4.05 M/UL (ref 3.8–5.4)
ERYTHROCYTE [DISTWIDTH] IN BLOOD: 13.8 % (ref 11.5–15.5)
GLUCOSE SERPL-MCNC: 125 MG/DL (ref 74–99)
HCT VFR BLD AUTO: 39.3 % (ref 34–46)
HGB BLD-MCNC: 12.8 GM/DL (ref 11.4–16)
MCH RBC QN AUTO: 31.6 PG (ref 25–35)
MCHC RBC AUTO-ENTMCNC: 32.5 G/DL (ref 31–37)
MCV RBC AUTO: 97.1 FL (ref 80–100)
PLATELET # BLD AUTO: 203 K/UL (ref 150–450)
POTASSIUM SERPL-SCNC: 4.7 MMOL/L (ref 3.5–5.1)
PROT SERPL-MCNC: 6.3 G/DL (ref 6.3–8.2)
SODIUM SERPL-SCNC: 137 MMOL/L (ref 137–145)
WBC # BLD AUTO: 6.9 K/UL (ref 3.8–10.6)

## 2021-11-04 RX ADMIN — CEFAZOLIN SCH MLS/HR: 330 INJECTION, POWDER, FOR SOLUTION INTRAMUSCULAR; INTRAVENOUS at 03:48

## 2021-11-04 RX ADMIN — CEFAZOLIN SCH MLS/HR: 330 INJECTION, POWDER, FOR SOLUTION INTRAMUSCULAR; INTRAVENOUS at 07:39

## 2021-11-04 RX ADMIN — HEPARIN SODIUM SCH UNIT: 5000 INJECTION INTRAVENOUS; SUBCUTANEOUS at 20:22

## 2021-11-04 RX ADMIN — HYDROCODONE BITARTRATE AND ACETAMINOPHEN PRN EACH: 5; 325 TABLET ORAL at 07:54

## 2021-11-04 RX ADMIN — HYDROCODONE BITARTRATE AND ACETAMINOPHEN PRN EACH: 5; 325 TABLET ORAL at 13:45

## 2021-11-04 RX ADMIN — HYDROCODONE BITARTRATE AND ACETAMINOPHEN PRN EACH: 5; 325 TABLET ORAL at 18:41

## 2021-11-04 RX ADMIN — PANTOPRAZOLE SODIUM SCH MG: 40 TABLET, DELAYED RELEASE ORAL at 06:26

## 2021-11-04 RX ADMIN — HYDROCODONE BITARTRATE AND ACETAMINOPHEN PRN EACH: 5; 325 TABLET ORAL at 00:12

## 2021-11-04 RX ADMIN — HEPARIN SODIUM SCH UNIT: 5000 INJECTION INTRAVENOUS; SUBCUTANEOUS at 07:38

## 2021-11-04 RX ADMIN — ATORVASTATIN CALCIUM SCH MG: 10 TABLET, FILM COATED ORAL at 20:22

## 2021-11-04 NOTE — P.PN
Subjective


Principal diagnosis: 





Left lower extremity cellulitis





The patient is seen after being given appropriate antibiotic treatment for left 

lower extremity cellulitis.  She feels much better today.  Pain is relatively 

well controlled but still somewhat tough to deal with.  Minimal ambulation is 

noted.  No voiding difficulties.  No significant nausea, vomiting or diarrhea is

noted.  No overt fever this morning.





The patient feels much better today still some blistering with the cellulitis 

but known voiding difficulties.





Objective





- Vital Signs


Vital signs: 


                                   Vital Signs











Temp  97.6 F   11/04/21 07:57


 


Pulse  85   11/04/21 07:57


 


Resp  14   11/04/21 07:57


 


BP  132/82   11/04/21 07:57


 


Pulse Ox  97   11/04/21 07:57








                                 Intake & Output











 11/03/21 11/04/21 11/04/21





 18:59 06:59 18:59


 


Intake Total  1210 


 


Balance  1210 


 


Intake:   


 


  Intake, IV Titration  850 





  Amount   


 


    Sodium Chloride 0.9% 1,  750 





    000 ml @ 75 mls/hr IV .   





    [id number] [facility] Rx#:[id number]   


 


    ceFAZolin 3 gm In Sodium  100 





    Chloride 0.9% 100 ml @   





    200 mls/hr IVPB Q8HR [facility]   





    Rx#:[id number]   


 


  Oral  360 


 


Other:   


 


  Voiding Method   Toilet


 


  # Voids 2 1 














- Constitutional


General appearance: Present: morbidly obese





- EENT


Eyes: Absent: abnormal pupil





- Neck


Neck: Absent: lymphadenopathy





- Respiratory


Respiratory: bilateral: CTA





- Cardiovascular


Rhythm: regular


Heart sounds: normal: S1, S2


Abnormal Heart Sounds: Absent: S3 Gallop





- Gastrointestinal


General gastrointestinal: Present: soft.  Absent: tenderness





- Neurologic


Neurologic: Present: CNII-XII intact





- Psychiatric


Psychiatric: Present: A&O x's 3





- Labs


CBC & Chem 7: 


                                 11/04/21 03:44





                                 11/04/21 03:44


Labs: 


                  Abnormal Lab Results - Last 24 Hours (Table)











  11/04/21 Range/Units





  03:44 


 


Glucose  125 H  (74-99)  mg/dL


 


Calcium  8.0 L  (8.4-10.2)  mg/dL


 


Albumin  2.8 L  (3.5-5.0)  g/dL








                      Microbiology - Last 24 Hours (Table)











 11/01/21 16:25 Blood Culture - Preliminary





 Blood    No Growth after 48 hours


 


 11/01/21 16:25 Blood Culture - Preliminary





 Blood    No Growth after 48 hours














Assessment and Plan


(1) Rheumatoid arthritis


Current Visit: Yes   Status: Acute   Code(s): M06.9 - RHEUMATOID ARTHRITIS, 

UNSPECIFIED   SNOMED Code(s): 05240687


   





(2) Morbid obesity


Current Visit: Yes   Status: Acute   Code(s): E66.01 - MORBID (SEVERE) OBESITY 

DUE TO EXCESS CALORIES   SNOMED Code(s): 163088314


   





(3) Psoriasis


Current Visit: Yes   Status: Acute   Code(s): L40.9 - PSORIASIS, UNSPECIFIED   

SNOMED Code(s): 3759870


   





(4) Left leg cellulitis


Current Visit: Yes   Status: Acute   Code(s): L03.116 - CELLULITIS OF LEFT LOWER

LIMB   SNOMED Code(s): 328755643


   


Plan: 





Continue Unasyn.





Continue follow with infectious disease.  New pack check CBC and CMP in a.m.





Anticipate discharge in next 24-48 hours once cultures are done and if her 

current trajectory of recovery continues.

## 2021-11-04 NOTE — PN
PROGRESS NOTE



DATE OF SERVICE:

11/04/2021



REASON FOR FOLLOWUP:

Left lower extremity cellulitis.



INTERVAL HISTORY:

Patient is afebrile.  The patient is currently breathing comfortably.  Denies having

any chest pain.  No shortness of breath, cough. No abdominal pain.  Overall pain and

discomfort to the left leg has decreased.



EXAMINATION:

Blood pressure 132/82 with a pulse of 85. Temperature is 97.6.  She is 97% on room air.

General description is a middle-aged female up in the chair in no distress.

Respiratory system: Unlabored breathing, clear to auscultation anteriorly. Heart S1,

S2.  Regular rate and rhythm. Abdomen soft, no tenderness. Left leg swelling and

redness has slightly decreased.



LABORATORY DATA:

Hemoglobin 12.1, white count 6.9, creatinine 0.66.



DIAGNOSTIC IMPRESSION AND PLAN:

Patient with acute left lower extremity cellulitis with diffuse swelling and redness.

The patient clinically responding to IV cefazolin that will be continued for another 24

hours before arranging oral antibiotics.  Close outpatient followup.





MMODL / IJN: [id number] / Job#: [id number]

## 2021-11-05 VITALS — TEMPERATURE: 98.1 F | DIASTOLIC BLOOD PRESSURE: 69 MMHG | HEART RATE: 83 BPM | SYSTOLIC BLOOD PRESSURE: 148 MMHG

## 2021-11-05 LAB
ALBUMIN SERPL-MCNC: 2.7 G/DL (ref 3.5–5)
ALP SERPL-CCNC: 83 U/L (ref 38–126)
ALT SERPL-CCNC: 22 U/L (ref 4–34)
ANION GAP SERPL CALC-SCNC: 5 MMOL/L
AST SERPL-CCNC: 41 U/L (ref 14–36)
BUN SERPL-SCNC: 10 MG/DL (ref 7–17)
CALCIUM SPEC-MCNC: 8.1 MG/DL (ref 8.4–10.2)
CHLORIDE SERPL-SCNC: 107 MMOL/L (ref 98–107)
CO2 SERPL-SCNC: 25 MMOL/L (ref 22–30)
ERYTHROCYTE [DISTWIDTH] IN BLOOD BY AUTOMATED COUNT: 4.06 M/UL (ref 3.8–5.4)
ERYTHROCYTE [DISTWIDTH] IN BLOOD: 13.8 % (ref 11.5–15.5)
GLUCOSE SERPL-MCNC: 109 MG/DL (ref 74–99)
HCT VFR BLD AUTO: 39.3 % (ref 34–46)
HGB BLD-MCNC: 12.4 GM/DL (ref 11.4–16)
MCH RBC QN AUTO: 30.5 PG (ref 25–35)
MCHC RBC AUTO-ENTMCNC: 31.5 G/DL (ref 31–37)
MCV RBC AUTO: 96.8 FL (ref 80–100)
PLATELET # BLD AUTO: 233 K/UL (ref 150–450)
POTASSIUM SERPL-SCNC: 4.8 MMOL/L (ref 3.5–5.1)
PROT SERPL-MCNC: 6.1 G/DL (ref 6.3–8.2)
SODIUM SERPL-SCNC: 137 MMOL/L (ref 137–145)
WBC # BLD AUTO: 6.3 K/UL (ref 3.8–10.6)

## 2021-11-05 RX ADMIN — HYDROCODONE BITARTRATE AND ACETAMINOPHEN PRN EACH: 5; 325 TABLET ORAL at 09:21

## 2021-11-05 RX ADMIN — HEPARIN SODIUM SCH UNIT: 5000 INJECTION INTRAVENOUS; SUBCUTANEOUS at 08:33

## 2021-11-05 RX ADMIN — HYDROCODONE BITARTRATE AND ACETAMINOPHEN PRN EACH: 5; 325 TABLET ORAL at 00:24

## 2021-11-05 RX ADMIN — HYDROCODONE BITARTRATE AND ACETAMINOPHEN PRN EACH: 5; 325 TABLET ORAL at 16:15

## 2021-11-05 RX ADMIN — PANTOPRAZOLE SODIUM SCH MG: 40 TABLET, DELAYED RELEASE ORAL at 08:33

## 2021-11-05 NOTE — P.DS
Providers


Date of admission: 


11/01/21 18:30





Attending physician: 


Jimenez Mejía





Consults: 





                                        





11/01/21 18:29


Consult Physician Urgent 


   Consulting Provider: Teresa Martin


   Consult Reason/Comments: lle cellulitis


   Do you want consulting provider notified?: Yes











Primary care physician: 


Jimenez Mejía








- Discharge Diagnosis(es)


(1) Rheumatoid arthritis


Current Visit: Yes   Status: Acute   





(2) Morbid obesity


Current Visit: Yes   Status: Acute   





(3) Psoriasis


Current Visit: Yes   Status: Acute   





(4) Left leg cellulitis


Current Visit: Yes   Status: Acute   


Hospital Course: 





The patient is here essentially for significant problems related to left lower 

extremity cellulitis.  The patient states on IV therapy and did quite well.  

She'll be discharged once cleared by infectious disease.  She did quite well 

with Unasyn and vancomycin.  No side effects from this.  By mouth intake was 

nominal and no significant diarrhea was stated on the day of discharge.  





We will follow-up in about 3-5 days


Patient Condition at Discharge: Stable





Plan - Discharge Summary


New Discharge Prescriptions: 


No Action


   Ibuprofen [Advil] 200 mg PO Q6HR PRN


     PRN Reason: Pain


   Simvastatin [Zocor] 20 mg PO HS


   Adalimumab [Humira(Cf) Pen] 40 mg SQ Q14D


   traMADol HCL 50 mg PO Q6H PRN


     PRN Reason: Pain


Discharge Medication List





Ibuprofen [Advil] 200 mg PO Q6HR PRN 10/08/14 [History]


Adalimumab [Humira(Cf) Pen] 40 mg SQ Q14D 11/01/21 [History]


Simvastatin [Zocor] 20 mg PO HS 11/01/21 [History]


traMADol HCL 50 mg PO Q6H PRN 11/01/21 [History]








Follow up Appointment(s)/Referral(s): 


Jimenez Mejía MD [Primary Care Provider] - 1-2 days


[facility] Visiting Nurse, [NON-STAFF] - 1-2 Days


Activity/Diet/Wound Care/Special Instructions: 


Patient wonder if can continue Humara at home

## 2021-11-05 NOTE — PN
PROGRESS NOTE



DATE OF SERVICE:

11/05/2021



REASON FOR FOLLOWUP:

Left lower extremity cellulitis.



INTERVAL HISTORY:

Patient is afebrile.  She is breathing comfortably.  Denies any chest pain, shortness

of breath or cough.  No nausea.  No vomiting. No abdominal pain.  Overall pain and

discomfort to the left leg has decreased.



PHYSICAL EXAMINATION:

Blood pressure is 140/69, pulse of 83, temperature 98.1.  She is 98% on room air.

General description is a middle-aged female up in the chair in no distress.

Respiratory system: Unlabored breathing, clear to auscultation anteriorly. Heart S1,

S2.  Regular rate and rhythm. Abdomen soft, no tenderness. Left leg swelling and

redness has decreased.



LABS:

Hemoglobin is 12.4, white count 6.3, creatinine 0.67.



DIAGNOSTIC IMPRESSION AND PLAN:

Patient with acute left lower extremity cellulitis.  Overall improvement with cefazolin

finishing therapy with oral Keflex. Prescription sent to the pharmacy. Close outpatient

followup.





MMODL / IJN: [id number] / Job#: [id number]

## 2021-11-19 ENCOUNTER — HOSPITAL ENCOUNTER (OUTPATIENT)
Dept: HOSPITAL 47 - RADUSWWP | Age: 66
End: 2021-11-19
Attending: FAMILY MEDICINE
Payer: MEDICARE

## 2021-11-19 DIAGNOSIS — R22.42: ICD-10-CM

## 2021-11-19 DIAGNOSIS — M79.662: Primary | ICD-10-CM

## 2021-11-19 NOTE — US
EXAMINATION TYPE: US venous doppler duplex LE LT

 

DATE OF EXAM: 11/19/2021 3:09 PM

 

COMPARISON: 11/1/2021

 

CLINICAL HISTORY: M79.662,R22.42 PAIN AND SWELLING LEFT LOWER LIMB.

 

SIDE PERFORMED: Left  

 

TECHNIQUE:  The lower extremity deep venous system is examined utilizing real time linear array sonog
sadie with graded compression, doppler sonography and color-flow sonography.

 

VESSELS IMAGED:

Common Femoral Vein

Deep Femoral Vein

Greater Saphenous Vein *

Femoral Vein

Popliteal Vein

Small Saphenous Vein *

Proximal Calf Veins

(* superficial vessels)

 

There is normal flow, compressibility, vascular waveforms.

 

 

Left Leg:  Negative for DVT

 

 

 

IMPRESSION:  No evidence of deep venous thrombosis within the left lower extremity from the level of 
the knee centrally

## 2023-01-10 ENCOUNTER — HOSPITAL ENCOUNTER (OUTPATIENT)
Dept: HOSPITAL 47 - RADMAMWWP | Age: 68
Discharge: HOME | End: 2023-01-10
Attending: FAMILY MEDICINE
Payer: MEDICARE

## 2023-01-10 DIAGNOSIS — Z12.31: Primary | ICD-10-CM

## 2023-01-10 DIAGNOSIS — Z78.0: ICD-10-CM

## 2023-01-10 DIAGNOSIS — Z80.3: ICD-10-CM

## 2023-01-10 PROCEDURE — 77067 SCR MAMMO BI INCL CAD: CPT

## 2023-01-11 NOTE — MM
Reason for Exam: Screening  (asymptomatic). 

Last mammogram was performed 5 year(s) and 9 month(s) ago. 





Patient History: 

Menarche at age 14. Patient has no children. Left ovary removed at age 59. Right ovary removed at

age 59. Hysterectomy at age 59. Postmenopausal.

Maternal cousin had breast cancer, age 40. 





Risk Values: 

Luz 5 year model risk: 1.8%.

NCI Lifetime model risk: 6.9%.





Prior Study Comparison: 

1/18/2016 Bilateral Screening Mammogram, [facility]. 2/19/2016 Left Diagnostic Mammogram, [facility]. 4/11/2017

Bilateral Screening Mammogram, [facility]. 





Tissue Density: 

There are scattered fibroglandular densities.





Findings: 

Analyzed By CAD. 

Pattern is stable. Benign calcification within the right breast.

No suspicious groups of microcalcifications, spiculated or lobular masses, architectural distortion

or other secondary signs of malignancy are mammographically apparent. 





Overall Assessment: Benign, BI-RAD 2





Management: 

Screening Mammogram of both breasts in 1 year.

A negative mammogram report should not preclude additional follow up of suspicious palpable

abnormalities.

Patient should continue monthly self breast exam.

A clinical breast exam by your physician is recommended on an annual basis and results should be

correlated with mammographic findings.



Electronically signed and approved by: Antione Monge D.O. Radiologis

## 2023-01-31 ENCOUNTER — HOSPITAL ENCOUNTER (OUTPATIENT)
Dept: HOSPITAL 47 - ORWHC2ENDO | Age: 68
Discharge: HOME | End: 2023-01-31
Attending: INTERNAL MEDICINE
Payer: COMMERCIAL

## 2023-01-31 VITALS — BODY MASS INDEX: 32.1 KG/M2

## 2023-01-31 VITALS — DIASTOLIC BLOOD PRESSURE: 76 MMHG | HEART RATE: 84 BPM | SYSTOLIC BLOOD PRESSURE: 125 MMHG

## 2023-01-31 VITALS — RESPIRATION RATE: 16 BRPM

## 2023-01-31 VITALS — TEMPERATURE: 97.3 F

## 2023-01-31 DIAGNOSIS — E78.5: ICD-10-CM

## 2023-01-31 DIAGNOSIS — Z80.0: ICD-10-CM

## 2023-01-31 DIAGNOSIS — Z91.048: ICD-10-CM

## 2023-01-31 DIAGNOSIS — Z79.899: ICD-10-CM

## 2023-01-31 DIAGNOSIS — Z79.84: ICD-10-CM

## 2023-01-31 DIAGNOSIS — Z12.11: Primary | ICD-10-CM

## 2023-01-31 DIAGNOSIS — D12.0: ICD-10-CM

## 2023-01-31 DIAGNOSIS — M06.9: ICD-10-CM

## 2023-01-31 DIAGNOSIS — D12.4: ICD-10-CM

## 2023-01-31 DIAGNOSIS — Z79.1: ICD-10-CM

## 2023-01-31 DIAGNOSIS — D12.3: ICD-10-CM

## 2023-01-31 DIAGNOSIS — E11.9: ICD-10-CM

## 2023-01-31 DIAGNOSIS — Z98.890: ICD-10-CM

## 2023-01-31 LAB — GLUCOSE BLD-MCNC: 112 MG/DL (ref 70–110)

## 2023-01-31 PROCEDURE — 88305 TISSUE EXAM BY PATHOLOGIST: CPT

## 2023-01-31 PROCEDURE — 45380 COLONOSCOPY AND BIOPSY: CPT

## 2025-04-07 ENCOUNTER — HOSPITAL ENCOUNTER (OUTPATIENT)
Dept: HOSPITAL 47 - RADUSWWP | Age: 70
Discharge: HOME | End: 2025-04-07
Attending: FAMILY MEDICINE
Payer: MEDICARE

## 2025-04-07 DIAGNOSIS — I73.9: ICD-10-CM

## 2025-04-07 DIAGNOSIS — I65.22: Primary | ICD-10-CM

## 2025-04-07 PROCEDURE — 93922 UPR/L XTREMITY ART 2 LEVELS: CPT

## 2025-07-09 ENCOUNTER — HOSPITAL ENCOUNTER (OUTPATIENT)
Dept: HOSPITAL 47 - RADCTMAIN | Age: 70
Discharge: HOME | End: 2025-07-09
Attending: FAMILY MEDICINE
Payer: MEDICARE

## 2025-07-09 DIAGNOSIS — R90.82: Primary | ICD-10-CM

## 2025-07-09 PROCEDURE — 70450 CT HEAD/BRAIN W/O DYE: CPT
